# Patient Record
Sex: MALE | Race: WHITE | NOT HISPANIC OR LATINO | Employment: UNEMPLOYED | URBAN - NONMETROPOLITAN AREA
[De-identification: names, ages, dates, MRNs, and addresses within clinical notes are randomized per-mention and may not be internally consistent; named-entity substitution may affect disease eponyms.]

---

## 2018-03-17 ENCOUNTER — HOSPITAL ENCOUNTER (EMERGENCY)
Facility: HOSPITAL | Age: 28
Discharge: ANOTHER HOSPITAL | End: 2018-03-17
Attending: EMERGENCY MEDICINE | Admitting: EMERGENCY MEDICINE

## 2018-03-17 ENCOUNTER — APPOINTMENT (OUTPATIENT)
Dept: GENERAL RADIOLOGY | Facility: HOSPITAL | Age: 28
End: 2018-03-17

## 2018-03-17 VITALS
BODY MASS INDEX: 16.66 KG/M2 | WEIGHT: 119 LBS | OXYGEN SATURATION: 100 % | RESPIRATION RATE: 16 BRPM | TEMPERATURE: 97.8 F | SYSTOLIC BLOOD PRESSURE: 102 MMHG | HEART RATE: 97 BPM | HEIGHT: 71 IN | DIASTOLIC BLOOD PRESSURE: 69 MMHG

## 2018-03-17 DIAGNOSIS — R01.1 HEART MURMUR: ICD-10-CM

## 2018-03-17 DIAGNOSIS — N17.9 ACUTE RENAL FAILURE, UNSPECIFIED ACUTE RENAL FAILURE TYPE (HCC): ICD-10-CM

## 2018-03-17 DIAGNOSIS — F19.90 IV DRUG USER: ICD-10-CM

## 2018-03-17 DIAGNOSIS — D64.9 ANEMIA, UNSPECIFIED TYPE: Primary | ICD-10-CM

## 2018-03-17 DIAGNOSIS — D61.818 PANCYTOPENIA (HCC): ICD-10-CM

## 2018-03-17 LAB
ABO GROUP BLD: NORMAL
ABO GROUP BLD: NORMAL
ALBUMIN SERPL-MCNC: 2.4 G/DL (ref 3.5–5)
ALBUMIN/GLOB SERPL: 0.7 G/DL (ref 1.5–2.5)
ALP SERPL-CCNC: 62 U/L (ref 40–129)
ALT SERPL W P-5'-P-CCNC: 25 U/L (ref 10–44)
ANION GAP SERPL CALCULATED.3IONS-SCNC: 16.4 MMOL/L (ref 3.6–11.2)
APAP SERPL-MCNC: <10 MCG/ML (ref 0–200)
APTT PPP: 33.4 SECONDS (ref 23.8–36.1)
AST SERPL-CCNC: 27 U/L (ref 10–34)
BASOPHILS # BLD AUTO: 0.01 10*3/MM3 (ref 0–0.3)
BASOPHILS NFR BLD AUTO: 0.4 % (ref 0–2)
BILIRUB SERPL-MCNC: 0.1 MG/DL (ref 0.2–1.8)
BLD GP AB SCN SERPL QL: NEGATIVE
BNP SERPL-MCNC: 645 PG/ML (ref 0–100)
BUN BLD-MCNC: 139 MG/DL (ref 7–21)
BUN/CREAT SERPL: 19.7 (ref 7–25)
CALCIUM SPEC-SCNC: 6.4 MG/DL (ref 7.7–10)
CHLORIDE SERPL-SCNC: 109 MMOL/L (ref 99–112)
CO2 SERPL-SCNC: 12.6 MMOL/L (ref 24.3–31.9)
CREAT BLD-MCNC: 7.04 MG/DL (ref 0.43–1.29)
CRP SERPL-MCNC: 7.6 MG/DL (ref 0–0.99)
D-LACTATE SERPL-SCNC: 0.8 MMOL/L (ref 0.5–2)
DEPRECATED RDW RBC AUTO: 43.2 FL (ref 37–54)
EOSINOPHIL # BLD AUTO: 0.01 10*3/MM3 (ref 0–0.7)
EOSINOPHIL NFR BLD AUTO: 0.4 % (ref 0–5)
ERYTHROCYTE [DISTWIDTH] IN BLOOD BY AUTOMATED COUNT: 16.7 % (ref 11.5–14.5)
ETHANOL BLD-MCNC: <10 MG/DL
ETHANOL UR QL: <0.01 %
GFR SERPL CREATININE-BSD FRML MDRD: 9 ML/MIN/1.73
GLOBULIN UR ELPH-MCNC: 3.4 GM/DL
GLUCOSE BLD-MCNC: 91 MG/DL (ref 70–110)
HCT VFR BLD AUTO: 11.2 % (ref 42–52)
HCT VFR BLD AUTO: 13.3 % (ref 42–52)
HGB BLD-MCNC: 3.5 G/DL (ref 14–18)
HGB BLD-MCNC: 4.1 G/DL (ref 14–18)
IMM GRANULOCYTES # BLD: 0.01 10*3/MM3 (ref 0–0.03)
IMM GRANULOCYTES NFR BLD: 0.4 % (ref 0–0.5)
INR PPP: 1.34 (ref 0.9–1.1)
LYMPHOCYTES # BLD AUTO: 0.33 10*3/MM3 (ref 1–3)
LYMPHOCYTES NFR BLD AUTO: 13.9 % (ref 21–51)
MCH RBC QN AUTO: 24 PG (ref 27–33)
MCHC RBC AUTO-ENTMCNC: 31.3 G/DL (ref 33–37)
MCV RBC AUTO: 76.7 FL (ref 80–94)
MONOCYTES # BLD AUTO: 0.16 10*3/MM3 (ref 0.1–0.9)
MONOCYTES NFR BLD AUTO: 6.7 % (ref 0–10)
NEUTROPHILS # BLD AUTO: 1.86 10*3/MM3 (ref 1.4–6.5)
NEUTROPHILS NFR BLD AUTO: 78.2 % (ref 30–70)
OSMOLALITY SERPL CALC.SUM OF ELEC: 320.4 MOSM/KG (ref 273–305)
PLATELET # BLD AUTO: 62 10*3/MM3 (ref 130–400)
PMV BLD AUTO: 10.3 FL (ref 6–10)
POTASSIUM BLD-SCNC: 5 MMOL/L (ref 3.5–5.3)
PROT SERPL-MCNC: 5.8 G/DL (ref 6–8)
PROTHROMBIN TIME: 16.8 SECONDS (ref 11–15.4)
RBC # BLD AUTO: 1.46 10*6/MM3 (ref 4.7–6.1)
RH BLD: POSITIVE
RH BLD: POSITIVE
SALICYLATES SERPL-MCNC: <1 MG/DL (ref 0–30)
SODIUM BLD-SCNC: 138 MMOL/L (ref 135–153)
TROPONIN I SERPL-MCNC: 0.28 NG/ML
WBC NRBC COR # BLD: 2.38 10*3/MM3 (ref 4.5–12.5)

## 2018-03-17 PROCEDURE — 86140 C-REACTIVE PROTEIN: CPT | Performed by: PHYSICIAN ASSISTANT

## 2018-03-17 PROCEDURE — 87040 BLOOD CULTURE FOR BACTERIA: CPT | Performed by: PHYSICIAN ASSISTANT

## 2018-03-17 PROCEDURE — 96368 THER/DIAG CONCURRENT INF: CPT

## 2018-03-17 PROCEDURE — 25010000002 ONDANSETRON PER 1 MG: Performed by: PHYSICIAN ASSISTANT

## 2018-03-17 PROCEDURE — 87186 SC STD MICRODIL/AGAR DIL: CPT | Performed by: PHYSICIAN ASSISTANT

## 2018-03-17 PROCEDURE — 93010 ELECTROCARDIOGRAM REPORT: CPT | Performed by: INTERNAL MEDICINE

## 2018-03-17 PROCEDURE — 85018 HEMOGLOBIN: CPT | Performed by: PHYSICIAN ASSISTANT

## 2018-03-17 PROCEDURE — 86901 BLOOD TYPING SEROLOGIC RH(D): CPT

## 2018-03-17 PROCEDURE — 86850 RBC ANTIBODY SCREEN: CPT | Performed by: PHYSICIAN ASSISTANT

## 2018-03-17 PROCEDURE — 96375 TX/PRO/DX INJ NEW DRUG ADDON: CPT

## 2018-03-17 PROCEDURE — 80053 COMPREHEN METABOLIC PANEL: CPT | Performed by: PHYSICIAN ASSISTANT

## 2018-03-17 PROCEDURE — 85014 HEMATOCRIT: CPT | Performed by: PHYSICIAN ASSISTANT

## 2018-03-17 PROCEDURE — 85610 PROTHROMBIN TIME: CPT | Performed by: PHYSICIAN ASSISTANT

## 2018-03-17 PROCEDURE — 96365 THER/PROPH/DIAG IV INF INIT: CPT

## 2018-03-17 PROCEDURE — 85730 THROMBOPLASTIN TIME PARTIAL: CPT | Performed by: PHYSICIAN ASSISTANT

## 2018-03-17 PROCEDURE — 83605 ASSAY OF LACTIC ACID: CPT | Performed by: PHYSICIAN ASSISTANT

## 2018-03-17 PROCEDURE — 93005 ELECTROCARDIOGRAM TRACING: CPT | Performed by: PHYSICIAN ASSISTANT

## 2018-03-17 PROCEDURE — 86900 BLOOD TYPING SEROLOGIC ABO: CPT | Performed by: PHYSICIAN ASSISTANT

## 2018-03-17 PROCEDURE — 96361 HYDRATE IV INFUSION ADD-ON: CPT

## 2018-03-17 PROCEDURE — 71046 X-RAY EXAM CHEST 2 VIEWS: CPT | Performed by: RADIOLOGY

## 2018-03-17 PROCEDURE — 71046 X-RAY EXAM CHEST 2 VIEWS: CPT

## 2018-03-17 PROCEDURE — 36415 COLL VENOUS BLD VENIPUNCTURE: CPT

## 2018-03-17 PROCEDURE — 86901 BLOOD TYPING SEROLOGIC RH(D): CPT | Performed by: PHYSICIAN ASSISTANT

## 2018-03-17 PROCEDURE — 80307 DRUG TEST PRSMV CHEM ANLYZR: CPT | Performed by: PHYSICIAN ASSISTANT

## 2018-03-17 PROCEDURE — 84484 ASSAY OF TROPONIN QUANT: CPT | Performed by: PHYSICIAN ASSISTANT

## 2018-03-17 PROCEDURE — 86900 BLOOD TYPING SEROLOGIC ABO: CPT

## 2018-03-17 PROCEDURE — 87077 CULTURE AEROBIC IDENTIFY: CPT | Performed by: PHYSICIAN ASSISTANT

## 2018-03-17 PROCEDURE — 86920 COMPATIBILITY TEST SPIN: CPT

## 2018-03-17 PROCEDURE — 87150 DNA/RNA AMPLIFIED PROBE: CPT | Performed by: PHYSICIAN ASSISTANT

## 2018-03-17 PROCEDURE — 25010000002 VANCOMYCIN PER 500 MG: Performed by: PHYSICIAN ASSISTANT

## 2018-03-17 PROCEDURE — 83880 ASSAY OF NATRIURETIC PEPTIDE: CPT | Performed by: PHYSICIAN ASSISTANT

## 2018-03-17 PROCEDURE — 85025 COMPLETE CBC W/AUTO DIFF WBC: CPT | Performed by: PHYSICIAN ASSISTANT

## 2018-03-17 PROCEDURE — 99284 EMERGENCY DEPT VISIT MOD MDM: CPT

## 2018-03-17 RX ORDER — SODIUM CHLORIDE 9 MG/ML
INJECTION, SOLUTION INTRAVENOUS
Status: DISCONTINUED
Start: 2018-03-17 | End: 2018-03-17 | Stop reason: HOSPADM

## 2018-03-17 RX ORDER — SODIUM CHLORIDE 0.9 % (FLUSH) 0.9 %
10 SYRINGE (ML) INJECTION AS NEEDED
Status: DISCONTINUED | OUTPATIENT
Start: 2018-03-17 | End: 2018-03-17 | Stop reason: HOSPADM

## 2018-03-17 RX ORDER — SODIUM CHLORIDE 9 MG/ML
125 INJECTION, SOLUTION INTRAVENOUS CONTINUOUS
Status: DISCONTINUED | OUTPATIENT
Start: 2018-03-17 | End: 2018-03-17 | Stop reason: HOSPADM

## 2018-03-17 RX ORDER — ONDANSETRON 2 MG/ML
4 INJECTION INTRAMUSCULAR; INTRAVENOUS ONCE
Status: COMPLETED | OUTPATIENT
Start: 2018-03-17 | End: 2018-03-17

## 2018-03-17 RX ADMIN — AZTREONAM 2 G: 2 INJECTION, POWDER, FOR SOLUTION INTRAMUSCULAR; INTRAVENOUS at 13:50

## 2018-03-17 RX ADMIN — ONDANSETRON 4 MG: 2 INJECTION, SOLUTION INTRAMUSCULAR; INTRAVENOUS at 12:12

## 2018-03-17 RX ADMIN — SODIUM CHLORIDE 125 ML/HR: 9 INJECTION, SOLUTION INTRAVENOUS at 13:49

## 2018-03-17 RX ADMIN — VANCOMYCIN HYDROCHLORIDE 1000 MG: 5 INJECTION, POWDER, LYOPHILIZED, FOR SOLUTION INTRAVENOUS at 13:14

## 2018-03-17 RX ADMIN — SODIUM CHLORIDE 500 ML: 9 INJECTION, SOLUTION INTRAVENOUS at 12:12

## 2018-03-17 NOTE — ED PROVIDER NOTES
Subjective     Mental Health Problem   Presenting symptoms comment:  Substance abuse  Degree of incapacity (severity):  Moderate  Onset quality:  Gradual  Duration: 7 years.  Timing:  Constant  Progression:  Worsening  Chronicity:  Chronic  Context: drug abuse (predominantly Suboxone at this time.  patient has used meth in the past though not for 3 months.)    Context: not alcohol use    Relieved by:  Nothing  Worsened by:  Nothing  Associated symptoms: no abdominal pain and no chest pain    Associated symptoms comment:  Patient has had nausea vomiting.  He denies diarrhea.  He has had increased shortness of breath over the past 2 weeks with chills.      Review of Systems   Constitutional: Negative.  Negative for fever.   HENT: Negative.    Respiratory: Negative.    Cardiovascular: Negative.  Negative for chest pain.   Gastrointestinal: Negative.  Negative for abdominal pain.   Endocrine: Negative.    Genitourinary: Negative.  Negative for dysuria.   Skin: Negative.    Neurological: Negative.    Psychiatric/Behavioral: Negative.    All other systems reviewed and are negative.      Past Medical History:   Diagnosis Date   • COPD (chronic obstructive pulmonary disease)        Allergies   Allergen Reactions   • Penicillins Hives     Pt states he tolerates Keflex       Past Surgical History:   Procedure Laterality Date   • INCISION AND DRAINAGE LEG Left 11/11/2016    Procedure: WASH OUT OF INFECTED LEFT BIG TOE;  Surgeon: Natasha Morales MD;  Location:  [facility];  Service:    • LUNG SURGERY      pt reports both lungs collapsed   • ORIF FOOT FRACTURE Left 9/13/2016    Procedure:  OPEN REDUCTION INTERNAL FIXATION LEFT GREAT TOE;  Surgeon: Natasha Morales MD;  Location: [facility] OR;  Service:        History reviewed. No pertinent family history.    Social History     Social History   • Marital status: Single     Social History Main Topics   • Smoking status: Current Every Day Smoker     Packs/day: 1.00     Years: 10.00      Types: Cigarettes   • Smokeless tobacco: Current User     Types: Chew   • Alcohol use No   • Drug use:      Types: Marijuana, IV      Comment: Joint a week      Other Topics Concern   • Not on file           Objective   Physical Exam   Constitutional: He is oriented to person, place, and time. He appears well-developed and well-nourished. No distress.   HENT:   Head: Normocephalic and atraumatic.   Right Ear: External ear normal.   Left Ear: External ear normal.   Nose: Nose normal.   Eyes: Conjunctivae and EOM are normal. Pupils are equal, round, and reactive to light.   Neck: Normal range of motion. Neck supple. No JVD present. No tracheal deviation present.   Cardiovascular: Normal rate and regular rhythm.    Murmur heard.  Pulmonary/Chest: Effort normal and breath sounds normal. No respiratory distress. He has no wheezes.   Abdominal: Soft. Bowel sounds are normal. There is no tenderness.   Musculoskeletal: Normal range of motion. He exhibits no edema or deformity.   Neurological: He is alert and oriented to person, place, and time. No cranial nerve deficit.   Skin: Skin is warm and dry. Capillary refill takes less than 2 seconds. No rash noted. He is not diaphoretic. No erythema. No pallor.   Psychiatric: He has a normal mood and affect. His behavior is normal. Thought content normal.   Nursing note and vitals reviewed.      Procedures         ED Course  ED Course   Comment By Time    contacted NICHOLAS Vail 03/17 0582   Accepted to  per Dr Valera in ER. NICHOLAS Vail 03/17 1347                  MDM  Number of Diagnoses or Management Options  Acute renal failure, unspecified acute renal failure type: new and requires workup  Anemia, unspecified type: new and requires workup  Heart murmur: new and requires workup  IV drug user: established and worsening  Pancytopenia: new and requires workup     Amount and/or Complexity of Data Reviewed  Clinical lab tests: reviewed and ordered  Tests in the  radiology section of CPT®: reviewed and ordered  Tests in the medicine section of CPT®: reviewed and ordered  Decide to obtain previous medical records or to obtain history from someone other than the patient: yes  Discuss the patient with other providers: yes  Independent visualization of images, tracings, or specimens: yes    Risk of Complications, Morbidity, and/or Mortality  Presenting problems: high  Diagnostic procedures: high  Management options: high    Critical Care  Total time providing critical care: 30-74 minutes (45)      Final diagnoses:   Anemia, unspecified type   Pancytopenia   Acute renal failure, unspecified acute renal failure type   Heart murmur   IV drug user            NICHOLAS Vail  03/17/18 [id number]

## 2018-03-17 NOTE — ED NOTES
Pt left at this time via [facility]. Blood insfusing at time of transfer.      Cirilo Mendoza RN  03/17/18 0020       Cirilo Mendoza RN  03/17/18 8005

## 2018-03-17 NOTE — ED NOTES
Contacted [facility] per provider needing flight. AE 79 , accepted flight @3104     Bebeto Bautista  03/17/18 5620

## 2018-03-18 LAB — BACTERIA BLD CULT: ABNORMAL

## 2018-03-20 LAB
BACTERIA SPEC AEROBE CULT: ABNORMAL
GRAM STN SPEC: ABNORMAL
GRAM STN SPEC: ABNORMAL
ISOLATED FROM: ABNORMAL
ISOLATED FROM: ABNORMAL

## 2018-03-21 LAB
ABO + RH BLD: NORMAL
ABO + RH BLD: NORMAL
BH BB BLOOD EXPIRATION DATE: NORMAL
BH BB BLOOD EXPIRATION DATE: NORMAL
BH BB BLOOD TYPE BARCODE: 5100
BH BB BLOOD TYPE BARCODE: 5100
BH BB DISPENSE STATUS: NORMAL
BH BB DISPENSE STATUS: NORMAL
BH BB PRODUCT CODE: NORMAL
BH BB PRODUCT CODE: NORMAL
BH BB UNIT NUMBER: NORMAL
BH BB UNIT NUMBER: NORMAL
CROSSMATCH INTERPRETATION: NORMAL
CROSSMATCH INTERPRETATION: NORMAL
UNIT  ABO: NORMAL
UNIT  ABO: NORMAL
UNIT  RH: NORMAL
UNIT  RH: NORMAL